# Patient Record
Sex: FEMALE | NOT HISPANIC OR LATINO | Employment: UNEMPLOYED | URBAN - METROPOLITAN AREA
[De-identification: names, ages, dates, MRNs, and addresses within clinical notes are randomized per-mention and may not be internally consistent; named-entity substitution may affect disease eponyms.]

---

## 2021-10-26 ENCOUNTER — OFFICE VISIT (OUTPATIENT)
Dept: INTERNAL MEDICINE | Facility: CLINIC | Age: 46
End: 2021-10-26

## 2021-10-26 VITALS
SYSTOLIC BLOOD PRESSURE: 136 MMHG | BODY MASS INDEX: 23.71 KG/M2 | HEART RATE: 89 BPM | WEIGHT: 133.8 LBS | RESPIRATION RATE: 17 BRPM | TEMPERATURE: 98.2 F | OXYGEN SATURATION: 99 % | HEIGHT: 63 IN | DIASTOLIC BLOOD PRESSURE: 76 MMHG

## 2021-10-26 DIAGNOSIS — Z12.31 ENCOUNTER FOR SCREENING MAMMOGRAM FOR MALIGNANT NEOPLASM OF BREAST: ICD-10-CM

## 2021-10-26 DIAGNOSIS — Z12.11 SCREEN FOR COLON CANCER: ICD-10-CM

## 2021-10-26 DIAGNOSIS — Z76.89 ENCOUNTER TO ESTABLISH CARE: ICD-10-CM

## 2021-10-26 DIAGNOSIS — R63.5 WEIGHT GAIN: ICD-10-CM

## 2021-10-26 DIAGNOSIS — Z13.31 SCREENING FOR DEPRESSION: ICD-10-CM

## 2021-10-26 DIAGNOSIS — Z23 NEED FOR INFLUENZA VACCINATION: Primary | ICD-10-CM

## 2021-10-26 DIAGNOSIS — S01.81XD FACIAL LACERATION, SUBSEQUENT ENCOUNTER: ICD-10-CM

## 2021-10-26 PROCEDURE — 99203 OFFICE O/P NEW LOW 30 MIN: CPT | Performed by: STUDENT IN AN ORGANIZED HEALTH CARE EDUCATION/TRAINING PROGRAM

## 2021-10-26 PROCEDURE — 90686 IIV4 VACC NO PRSV 0.5 ML IM: CPT | Performed by: STUDENT IN AN ORGANIZED HEALTH CARE EDUCATION/TRAINING PROGRAM

## 2021-10-26 PROCEDURE — 90471 IMMUNIZATION ADMIN: CPT | Performed by: STUDENT IN AN ORGANIZED HEALTH CARE EDUCATION/TRAINING PROGRAM

## 2021-10-26 RX ORDER — ACETAMINOPHEN 500 MG
1200 TABLET ORAL DAILY
Qty: 90 EACH | Refills: 3 | Status: SHIPPED | OUTPATIENT
Start: 2021-10-26 | End: 2022-02-16 | Stop reason: SDUPTHER

## 2021-10-26 RX ORDER — HYDROCORTISONE VALERATE CREAM 2 MG/G
1 CREAM TOPICAL 2 TIMES DAILY
Qty: 45 G | Refills: 11 | Status: SHIPPED | OUTPATIENT
Start: 2021-10-26 | End: 2022-11-22 | Stop reason: SDUPTHER

## 2021-10-26 RX ORDER — HYDROQUINONE 40 MG/G
1 CREAM TOPICAL DAILY
Qty: 28.35 G | Refills: 11 | Status: SHIPPED | OUTPATIENT
Start: 2021-10-26 | End: 2022-11-22 | Stop reason: SDUPTHER

## 2021-10-26 RX ORDER — HYDROCORTISONE VALERATE CREAM 2 MG/G
1 CREAM TOPICAL 2 TIMES DAILY
COMMUNITY
End: 2021-10-26 | Stop reason: SDUPTHER

## 2021-10-26 RX ORDER — HYDROQUINONE 40 MG/G
1 CREAM TOPICAL DAILY
COMMUNITY
Start: 2021-09-17 | End: 2021-10-26 | Stop reason: SDUPTHER

## 2021-10-26 RX ORDER — ACETAMINOPHEN 500 MG
1200 TABLET ORAL DAILY
COMMUNITY
End: 2021-10-26 | Stop reason: SDUPTHER

## 2021-10-27 ENCOUNTER — PATIENT ROUNDING (BHMG ONLY) (OUTPATIENT)
Dept: INTERNAL MEDICINE | Facility: CLINIC | Age: 46
End: 2021-10-27

## 2021-10-27 NOTE — PROGRESS NOTES
October 27, 2021    Hello, may I speak with Cyndie Osborne?    My name is Geraldine      I am  with [facility]  [address] [place] [id number] [phone].    Left message to return call if there were any issues or needs from our office

## 2021-11-16 ENCOUNTER — CLINICAL SUPPORT (OUTPATIENT)
Dept: INTERNAL MEDICINE | Facility: CLINIC | Age: 46
End: 2021-11-16

## 2021-11-16 DIAGNOSIS — R63.5 WEIGHT GAIN: ICD-10-CM

## 2021-11-16 LAB
ALBUMIN SERPL-MCNC: 4.4 G/DL (ref 3.5–5.2)
ALBUMIN/GLOB SERPL: 1.7 G/DL
ALP SERPL-CCNC: 45 U/L (ref 39–117)
ALT SERPL W P-5'-P-CCNC: 7 U/L (ref 1–33)
ANION GAP SERPL CALCULATED.3IONS-SCNC: 7.8 MMOL/L (ref 5–15)
AST SERPL-CCNC: 13 U/L (ref 1–32)
BASOPHILS # BLD AUTO: 0.03 10*3/MM3 (ref 0–0.2)
BASOPHILS NFR BLD AUTO: 0.6 % (ref 0–1.5)
BILIRUB SERPL-MCNC: 0.4 MG/DL (ref 0–1.2)
BUN SERPL-MCNC: 11 MG/DL (ref 6–20)
BUN/CREAT SERPL: 13.9 (ref 7–25)
CALCIUM SPEC-SCNC: 8.8 MG/DL (ref 8.6–10.5)
CHLORIDE SERPL-SCNC: 104 MMOL/L (ref 98–107)
CHOLEST SERPL-MCNC: 166 MG/DL (ref 0–200)
CO2 SERPL-SCNC: 26.2 MMOL/L (ref 22–29)
CORTIS AM PEAK SERPL-MCNC: 10.72 MCG/DL (ref 6.02–18.4)
CREAT SERPL-MCNC: 0.79 MG/DL (ref 0.57–1)
DEPRECATED RDW RBC AUTO: 44.8 FL (ref 37–54)
EOSINOPHIL # BLD AUTO: 0.04 10*3/MM3 (ref 0–0.4)
EOSINOPHIL NFR BLD AUTO: 0.9 % (ref 0.3–6.2)
ERYTHROCYTE [DISTWIDTH] IN BLOOD BY AUTOMATED COUNT: 11.8 % (ref 12.3–15.4)
GFR SERPL CREATININE-BSD FRML MDRD: 79 ML/MIN/1.73
GFR SERPL CREATININE-BSD FRML MDRD: 95 ML/MIN/1.73
GLOBULIN UR ELPH-MCNC: 2.6 GM/DL
GLUCOSE SERPL-MCNC: 93 MG/DL (ref 65–99)
HBA1C MFR BLD: 4.94 % (ref 4.8–5.6)
HCT VFR BLD AUTO: 44.2 % (ref 34–46.6)
HDLC SERPL-MCNC: 61 MG/DL (ref 40–60)
HGB BLD-MCNC: 13.9 G/DL (ref 12–15.9)
IMM GRANULOCYTES # BLD AUTO: 0.01 10*3/MM3 (ref 0–0.05)
IMM GRANULOCYTES NFR BLD AUTO: 0.2 % (ref 0–0.5)
LDLC SERPL CALC-MCNC: 92 MG/DL (ref 0–100)
LDLC/HDLC SERPL: 1.5 {RATIO}
LYMPHOCYTES # BLD AUTO: 1.42 10*3/MM3 (ref 0.7–3.1)
LYMPHOCYTES NFR BLD AUTO: 30.3 % (ref 19.6–45.3)
MCH RBC QN AUTO: 32 PG (ref 26.6–33)
MCHC RBC AUTO-ENTMCNC: 31.4 G/DL (ref 31.5–35.7)
MCV RBC AUTO: 101.8 FL (ref 79–97)
MONOCYTES # BLD AUTO: 0.35 10*3/MM3 (ref 0.1–0.9)
MONOCYTES NFR BLD AUTO: 7.5 % (ref 5–12)
NEUTROPHILS NFR BLD AUTO: 2.83 10*3/MM3 (ref 1.7–7)
NEUTROPHILS NFR BLD AUTO: 60.5 % (ref 42.7–76)
NRBC BLD AUTO-RTO: 0.2 /100 WBC (ref 0–0.2)
PLATELET # BLD AUTO: 259 10*3/MM3 (ref 140–450)
PMV BLD AUTO: 10.6 FL (ref 6–12)
POTASSIUM SERPL-SCNC: 3.9 MMOL/L (ref 3.5–5.2)
PROT SERPL-MCNC: 7 G/DL (ref 6–8.5)
RBC # BLD AUTO: 4.34 10*6/MM3 (ref 3.77–5.28)
SODIUM SERPL-SCNC: 138 MMOL/L (ref 136–145)
T4 FREE SERPL-MCNC: 1.21 NG/DL (ref 0.93–1.7)
TRIGL SERPL-MCNC: 68 MG/DL (ref 0–150)
TSH SERPL DL<=0.05 MIU/L-ACNC: 1.99 UIU/ML (ref 0.27–4.2)
VLDLC SERPL-MCNC: 13 MG/DL (ref 5–40)
WBC # BLD AUTO: 4.68 10*3/MM3 (ref 3.4–10.8)

## 2021-11-16 PROCEDURE — 85025 COMPLETE CBC W/AUTO DIFF WBC: CPT | Performed by: STUDENT IN AN ORGANIZED HEALTH CARE EDUCATION/TRAINING PROGRAM

## 2021-11-16 PROCEDURE — 80061 LIPID PANEL: CPT | Performed by: STUDENT IN AN ORGANIZED HEALTH CARE EDUCATION/TRAINING PROGRAM

## 2021-11-16 PROCEDURE — 82533 TOTAL CORTISOL: CPT | Performed by: STUDENT IN AN ORGANIZED HEALTH CARE EDUCATION/TRAINING PROGRAM

## 2021-11-16 PROCEDURE — 84443 ASSAY THYROID STIM HORMONE: CPT | Performed by: STUDENT IN AN ORGANIZED HEALTH CARE EDUCATION/TRAINING PROGRAM

## 2021-11-16 PROCEDURE — 84439 ASSAY OF FREE THYROXINE: CPT | Performed by: STUDENT IN AN ORGANIZED HEALTH CARE EDUCATION/TRAINING PROGRAM

## 2021-11-16 PROCEDURE — 83036 HEMOGLOBIN GLYCOSYLATED A1C: CPT | Performed by: STUDENT IN AN ORGANIZED HEALTH CARE EDUCATION/TRAINING PROGRAM

## 2021-11-16 PROCEDURE — 80053 COMPREHEN METABOLIC PANEL: CPT | Performed by: STUDENT IN AN ORGANIZED HEALTH CARE EDUCATION/TRAINING PROGRAM

## 2021-11-16 PROCEDURE — 36415 COLL VENOUS BLD VENIPUNCTURE: CPT | Performed by: STUDENT IN AN ORGANIZED HEALTH CARE EDUCATION/TRAINING PROGRAM

## 2021-11-16 PROCEDURE — 86800 THYROGLOBULIN ANTIBODY: CPT | Performed by: STUDENT IN AN ORGANIZED HEALTH CARE EDUCATION/TRAINING PROGRAM

## 2021-11-17 LAB — THYROGLOB AB SERPL-ACNC: <1 IU/ML (ref 0–0.9)

## 2021-12-03 ENCOUNTER — TELEPHONE (OUTPATIENT)
Dept: INTERNAL MEDICINE | Facility: CLINIC | Age: 46
End: 2021-12-03

## 2021-12-03 NOTE — TELEPHONE ENCOUNTER
Caller: Cyndie Osborne    Relationship to patient: Self    Best call back number: [phone]     PATIENT RECEIVED TEST RESULTS AND WANTED DOCTOR JOSUE OCHOA KNOW.

## 2021-12-04 NOTE — TELEPHONE ENCOUNTER
Dr. Reyna patient just wanted to let you know that she has received her lab results. Nothing else said so I'm not sure what else I need to do.

## 2022-02-16 ENCOUNTER — OFFICE VISIT (OUTPATIENT)
Dept: INTERNAL MEDICINE | Facility: CLINIC | Age: 47
End: 2022-02-16

## 2022-02-16 VITALS
TEMPERATURE: 98.1 F | WEIGHT: 131.2 LBS | RESPIRATION RATE: 17 BRPM | BODY MASS INDEX: 23.25 KG/M2 | DIASTOLIC BLOOD PRESSURE: 76 MMHG | HEART RATE: 107 BPM | OXYGEN SATURATION: 100 % | SYSTOLIC BLOOD PRESSURE: 128 MMHG | HEIGHT: 63 IN

## 2022-02-16 DIAGNOSIS — D75.89 MACROCYTOSIS: ICD-10-CM

## 2022-02-16 DIAGNOSIS — S01.81XD FACIAL LACERATION, SUBSEQUENT ENCOUNTER: Primary | ICD-10-CM

## 2022-02-16 DIAGNOSIS — L65.9 ALOPECIA: ICD-10-CM

## 2022-02-16 PROCEDURE — 99213 OFFICE O/P EST LOW 20 MIN: CPT | Performed by: STUDENT IN AN ORGANIZED HEALTH CARE EDUCATION/TRAINING PROGRAM

## 2022-02-16 NOTE — PROGRESS NOTES
Chief Complaint  Weight Check, wants labs done at [facility] (needs a order), and Alopecia    Subjective            Cyndie Osborne presents to [facility]  History of Present Illness    Scar:   Last seen by plastic in November.   Next follow up scheduled for June.  Her current skin regimen include:   Chemical peel about 2x per week  microdermabrasion every other day       Scar has been doing better.     Alopecia:   Rogaine was recommended for small patch of alopecia over left frontal scalp. Patient decided to trial Nioxin product instead for fear of potential hair loss with Rogaine.   Started using Nioxin over concerning patch, used for 3 mos and started noticing worsening hairloss. Hair turned brittle, and pt experienced swelling at site with scab and burning of her scalp.     She stopped using the Nioxin and the area is now started to heal.     Hair thinning:   Wanting to take high dose of biotin, hair and nails supplement.   Has hx of abnormal kidney labs in past and was told to discontinue all otc multivitamins.   She would like to make sure that the biotin supplement she is considering is safe to take.     Of note patient with macrocytosis w/o anemia on her last labs. She was informed of this as well as need for B12 and folate levels. Pt increased B12 and folate supplements pre-emptively and had large skin sores which improved with her discontinuing her B12 and folate.       History reviewed. No pertinent past medical history.    Allergies:   Allergies   Allergen Reactions   • Aloe Vera Other (See Comments)     Bruises all over her body, messed with her labs          History reviewed. No pertinent surgical history.       Social History     Socioeconomic History   • Marital status:    Tobacco Use   • Smoking status: Never Smoker   • Smokeless tobacco: Never Used   Vaping Use   • Vaping Use: Never used   Substance and Sexual Activity   • Alcohol use: Yes   • Drug  "use: Never   • Sexual activity: Yes     Partners: Male         History reviewed. No pertinent family history.       Health Maintenance Due   Topic Date Due   • COLORECTAL CANCER SCREENING  Never done   • ANNUAL PHYSICAL  Never done   • COVID-19 Vaccine (1) Never done   • TDAP/TD VACCINES (1 - Tdap) Never done   • HEPATITIS C SCREENING  Never done   • PAP SMEAR  Never done            Current Outpatient Medications:   •  Cyanocobalamin 1000 MCG capsule, Take 1,000 mcg by mouth Daily., Disp: 90 capsule, Rfl: 3  •  hydrocortisone (WESTCORT) 0.2 % cream, Apply 1 application topically to the appropriate area as directed 2 (Two) Times a Day., Disp: 45 g, Rfl: 11  •  hydrocortisone 2.5 % cream, Apply 1 application topically to the appropriate area as directed Daily. Rectal, Disp: 28 g, Rfl: 11  •  hydroquinone 4 % cream, Apply 1 application topically to the appropriate area as directed Daily., Disp: 28.35 g, Rfl: 11      Immunization History   Administered Date(s) Administered   • FluLaval/Fluarix/Fluzone >6 10/26/2021         Review of Systems   Per HPI     Objective       Vitals:    02/16/22 1340   BP: 128/76   BP Location: Right arm   Patient Position: Sitting   Pulse: 107   Resp: 17   Temp: 98.1 °F (36.7 °C)   TempSrc: Oral   SpO2: 100%   Weight: 59.5 kg (131 lb 3.2 oz)   Height: 160 cm (62.99\")     Body mass index is 23.25 kg/m².      Physical Exam  Vitals reviewed.   Constitutional:       Appearance: Normal appearance.   HENT:      Head: Normocephalic and atraumatic.      Nose: Nose normal.   Eyes:      Extraocular Movements: Extraocular movements intact.      Conjunctiva/sclera: Conjunctivae normal.   Pulmonary:      Effort: Pulmonary effort is normal. No respiratory distress.   Musculoskeletal:         General: Normal range of motion.   Skin:     General: Skin is warm and dry.      Comments: Frontal scar is significantly improved compared to prior visit.     Thinning hair over frontal scalp, mild scarring with scab  " noted over involved area.    Neurological:      General: No focal deficit present.      Mental Status: She is alert and oriented to person, place, and time.      Cranial Nerves: No cranial nerve deficit.   Psychiatric:         Mood and Affect: Mood normal.         Behavior: Behavior normal.         Thought Content: Thought content normal.             Result Review :                           Assessment and Plan      Diagnoses and all orders for this visit:    1. Facial laceration, subsequent encounter (Primary)  Comments:  Chronic, improving with current home regimen which she is advised to continue. Following with plastic.     2. Alopecia  Comments:  Chronic, stable and improving with discontinuation of nioxin. Reviewed biotin product that she is planning to use, and ingredients appear to be safe.     3. Macrocytosis  Comments:  Noted on labs from November. B12 and folate labs ordered. order printed as she is planning to get these done at [facility].   Orders:  -     Cancel: Vitamin B12 & Folate; Future  -     Vitamin B12 & Folate             Follow Up     Return in about 6 months (around 8/16/2022) for Recheck.    Patient was given instructions and counseling regarding her condition or for health maintenance advice. Please see specific information pulled into the AVS if appropriate.     Maribell Machuca MD   Internal Medicine-Pediatrics

## 2022-04-14 ENCOUNTER — OFFICE VISIT (OUTPATIENT)
Dept: INTERNAL MEDICINE | Facility: CLINIC | Age: 47
End: 2022-04-14

## 2022-04-14 VITALS
TEMPERATURE: 97.8 F | WEIGHT: 129.2 LBS | OXYGEN SATURATION: 99 % | HEART RATE: 102 BPM | BODY MASS INDEX: 22.89 KG/M2 | HEIGHT: 63 IN | RESPIRATION RATE: 18 BRPM | DIASTOLIC BLOOD PRESSURE: 72 MMHG | SYSTOLIC BLOOD PRESSURE: 126 MMHG

## 2022-04-14 DIAGNOSIS — L65.9 HAIR THINNING: Primary | ICD-10-CM

## 2022-04-14 DIAGNOSIS — R21 SKIN RASH: ICD-10-CM

## 2022-04-14 PROCEDURE — 99214 OFFICE O/P EST MOD 30 MIN: CPT | Performed by: STUDENT IN AN ORGANIZED HEALTH CARE EDUCATION/TRAINING PROGRAM

## 2022-04-15 ENCOUNTER — CLINICAL SUPPORT (OUTPATIENT)
Dept: INTERNAL MEDICINE | Facility: CLINIC | Age: 47
End: 2022-04-15

## 2022-04-15 DIAGNOSIS — L65.9 HAIR THINNING: ICD-10-CM

## 2022-04-15 LAB
ALBUMIN SERPL-MCNC: 4.7 G/DL (ref 3.5–5.2)
ALBUMIN/GLOB SERPL: 1.8 G/DL
ALP SERPL-CCNC: 48 U/L (ref 39–117)
ALT SERPL W P-5'-P-CCNC: 10 U/L (ref 1–33)
ANION GAP SERPL CALCULATED.3IONS-SCNC: 10.6 MMOL/L (ref 5–15)
AST SERPL-CCNC: 16 U/L (ref 1–32)
BASOPHILS # BLD AUTO: 0.03 10*3/MM3 (ref 0–0.2)
BASOPHILS NFR BLD AUTO: 0.6 % (ref 0–1.5)
BILIRUB SERPL-MCNC: 0.6 MG/DL (ref 0–1.2)
BUN SERPL-MCNC: 10 MG/DL (ref 6–20)
BUN/CREAT SERPL: 11.4 (ref 7–25)
CALCIUM SPEC-SCNC: 9.5 MG/DL (ref 8.6–10.5)
CHLORIDE SERPL-SCNC: 104 MMOL/L (ref 98–107)
CHOLEST SERPL-MCNC: 192 MG/DL (ref 0–200)
CO2 SERPL-SCNC: 22.4 MMOL/L (ref 22–29)
CREAT SERPL-MCNC: 0.88 MG/DL (ref 0.57–1)
DEPRECATED RDW RBC AUTO: 43.4 FL (ref 37–54)
EGFRCR SERPLBLD CKD-EPI 2021: 82.7 ML/MIN/1.73
EOSINOPHIL # BLD AUTO: 0.08 10*3/MM3 (ref 0–0.4)
EOSINOPHIL NFR BLD AUTO: 1.6 % (ref 0.3–6.2)
ERYTHROCYTE [DISTWIDTH] IN BLOOD BY AUTOMATED COUNT: 11.9 % (ref 12.3–15.4)
FOLATE SERPL-MCNC: 18 NG/ML (ref 4.78–24.2)
FSH SERPL-ACNC: 13.5 MIU/ML
GLOBULIN UR ELPH-MCNC: 2.6 GM/DL
GLUCOSE SERPL-MCNC: 93 MG/DL (ref 65–99)
HCT VFR BLD AUTO: 42.6 % (ref 34–46.6)
HDLC SERPL-MCNC: 61 MG/DL (ref 40–60)
HGB BLD-MCNC: 14.1 G/DL (ref 12–15.9)
IMM GRANULOCYTES # BLD AUTO: 0.01 10*3/MM3 (ref 0–0.05)
IMM GRANULOCYTES NFR BLD AUTO: 0.2 % (ref 0–0.5)
LDLC SERPL CALC-MCNC: 122 MG/DL (ref 0–100)
LDLC/HDLC SERPL: 1.99 {RATIO}
LH SERPL-ACNC: 22.9 MIU/ML
LYMPHOCYTES # BLD AUTO: 1.51 10*3/MM3 (ref 0.7–3.1)
LYMPHOCYTES NFR BLD AUTO: 30.8 % (ref 19.6–45.3)
MCH RBC QN AUTO: 32.6 PG (ref 26.6–33)
MCHC RBC AUTO-ENTMCNC: 33.1 G/DL (ref 31.5–35.7)
MCV RBC AUTO: 98.4 FL (ref 79–97)
MONOCYTES # BLD AUTO: 0.45 10*3/MM3 (ref 0.1–0.9)
MONOCYTES NFR BLD AUTO: 9.2 % (ref 5–12)
NEUTROPHILS NFR BLD AUTO: 2.83 10*3/MM3 (ref 1.7–7)
NEUTROPHILS NFR BLD AUTO: 57.6 % (ref 42.7–76)
NRBC BLD AUTO-RTO: 0 /100 WBC (ref 0–0.2)
PLATELET # BLD AUTO: 227 10*3/MM3 (ref 140–450)
PMV BLD AUTO: 10.6 FL (ref 6–12)
POTASSIUM SERPL-SCNC: 4.2 MMOL/L (ref 3.5–5.2)
PROT SERPL-MCNC: 7.3 G/DL (ref 6–8.5)
RBC # BLD AUTO: 4.33 10*6/MM3 (ref 3.77–5.28)
SODIUM SERPL-SCNC: 137 MMOL/L (ref 136–145)
T4 FREE SERPL-MCNC: 1.51 NG/DL (ref 0.93–1.7)
TRIGL SERPL-MCNC: 49 MG/DL (ref 0–150)
TSH SERPL DL<=0.05 MIU/L-ACNC: 2.07 UIU/ML (ref 0.27–4.2)
VIT B12 BLD-MCNC: 1513 PG/ML (ref 211–946)
VLDLC SERPL-MCNC: 9 MG/DL (ref 5–40)
WBC NRBC COR # BLD: 4.91 10*3/MM3 (ref 3.4–10.8)

## 2022-04-15 PROCEDURE — 80053 COMPREHEN METABOLIC PANEL: CPT | Performed by: STUDENT IN AN ORGANIZED HEALTH CARE EDUCATION/TRAINING PROGRAM

## 2022-04-15 PROCEDURE — 84402 ASSAY OF FREE TESTOSTERONE: CPT | Performed by: STUDENT IN AN ORGANIZED HEALTH CARE EDUCATION/TRAINING PROGRAM

## 2022-04-15 PROCEDURE — 84443 ASSAY THYROID STIM HORMONE: CPT | Performed by: STUDENT IN AN ORGANIZED HEALTH CARE EDUCATION/TRAINING PROGRAM

## 2022-04-15 PROCEDURE — 82679 ASSAY OF ESTRONE: CPT | Performed by: STUDENT IN AN ORGANIZED HEALTH CARE EDUCATION/TRAINING PROGRAM

## 2022-04-15 PROCEDURE — 82746 ASSAY OF FOLIC ACID SERUM: CPT | Performed by: STUDENT IN AN ORGANIZED HEALTH CARE EDUCATION/TRAINING PROGRAM

## 2022-04-15 PROCEDURE — 82627 DEHYDROEPIANDROSTERONE: CPT | Performed by: STUDENT IN AN ORGANIZED HEALTH CARE EDUCATION/TRAINING PROGRAM

## 2022-04-15 PROCEDURE — 84439 ASSAY OF FREE THYROXINE: CPT | Performed by: STUDENT IN AN ORGANIZED HEALTH CARE EDUCATION/TRAINING PROGRAM

## 2022-04-15 PROCEDURE — 82670 ASSAY OF TOTAL ESTRADIOL: CPT | Performed by: STUDENT IN AN ORGANIZED HEALTH CARE EDUCATION/TRAINING PROGRAM

## 2022-04-15 PROCEDURE — 82607 VITAMIN B-12: CPT | Performed by: STUDENT IN AN ORGANIZED HEALTH CARE EDUCATION/TRAINING PROGRAM

## 2022-04-15 PROCEDURE — 85025 COMPLETE CBC W/AUTO DIFF WBC: CPT | Performed by: STUDENT IN AN ORGANIZED HEALTH CARE EDUCATION/TRAINING PROGRAM

## 2022-04-15 PROCEDURE — 84403 ASSAY OF TOTAL TESTOSTERONE: CPT | Performed by: STUDENT IN AN ORGANIZED HEALTH CARE EDUCATION/TRAINING PROGRAM

## 2022-04-15 PROCEDURE — 83001 ASSAY OF GONADOTROPIN (FSH): CPT | Performed by: STUDENT IN AN ORGANIZED HEALTH CARE EDUCATION/TRAINING PROGRAM

## 2022-04-15 PROCEDURE — 83002 ASSAY OF GONADOTROPIN (LH): CPT | Performed by: STUDENT IN AN ORGANIZED HEALTH CARE EDUCATION/TRAINING PROGRAM

## 2022-04-15 PROCEDURE — 36415 COLL VENOUS BLD VENIPUNCTURE: CPT | Performed by: STUDENT IN AN ORGANIZED HEALTH CARE EDUCATION/TRAINING PROGRAM

## 2022-04-15 PROCEDURE — 80061 LIPID PANEL: CPT | Performed by: STUDENT IN AN ORGANIZED HEALTH CARE EDUCATION/TRAINING PROGRAM

## 2022-04-16 LAB — DHEA-S SERPL-MCNC: 115 UG/DL (ref 41.2–243.7)

## 2022-04-17 LAB
TESTOST FREE SERPL-MCNC: 0.8 PG/ML (ref 0–4.2)
TESTOST SERPL-MCNC: 20 NG/DL (ref 4–50)

## 2022-04-18 LAB
ESTRADIOL SERPL-MCNC: 291 PG/ML
ESTRONE SERPL-MCNC: 111 PG/ML

## 2022-04-28 ENCOUNTER — TELEPHONE (OUTPATIENT)
Dept: INTERNAL MEDICINE | Facility: CLINIC | Age: 47
End: 2022-04-28

## 2022-04-28 NOTE — TELEPHONE ENCOUNTER
Caller: Cyndie Osborne    Relationship: Self    Best call back number: [phone]      What test was performed: LABS    When was the test performed: 04/15/22    Where was the test performed: IN OFFICE    Additional notes: THE PATIENT WOULD LIKE A CALL BACK TO DISCUSS LAB RESULTS

## 2022-05-02 NOTE — TELEPHONE ENCOUNTER
Red rule verified and correct.    Pt calling again for results.    Advised HUB to let pt know that Dr Reyna will be contacted to give info regarding labs.

## 2022-05-02 NOTE — TELEPHONE ENCOUNTER
Caller: Cyndie Osborne    Relationship: Self    Best call back number: [phone]    Who are you requesting to speak with (clinical staff, provider,  specific staff member): CLINICAL    What was the call regarding: PATIENT STATES SHE IS STILL WAITING FOR THESE RESULTS.    Do you require a callback: YES

## 2022-05-03 NOTE — TELEPHONE ENCOUNTER
Hub staff attempted to follow warm transfer process and was unsuccessful     Caller: Cyndie Osborne    Relationship to patient: Self    Best call back number:  [phone]      Patient is needing:PATIENT CALLING REGARDING LAB RESULTS, PLEASE ADVISE.

## 2022-05-05 ENCOUNTER — TELEPHONE (OUTPATIENT)
Dept: INTERNAL MEDICINE | Facility: CLINIC | Age: 47
End: 2022-05-05

## 2022-05-05 NOTE — TELEPHONE ENCOUNTER
Pt called this morning asking about her lab results from 4/15/2022. Pt was notified about her Vit B12 and folate results. A secure message was sent to PCP to please interpret lab results so that pt could be notified. Pt was also notified that we will call her once PCP has interpreted her lab values. Pt voiced understanding.

## 2022-05-06 ENCOUNTER — TELEPHONE (OUTPATIENT)
Dept: INTERNAL MEDICINE | Facility: CLINIC | Age: 47
End: 2022-05-06

## 2022-05-06 NOTE — TELEPHONE ENCOUNTER
Patient is calling and would like her lab results from 04/15/22. Only the b12 that I see has been resulted on.

## 2022-05-09 ENCOUNTER — TELEPHONE (OUTPATIENT)
Dept: INTERNAL MEDICINE | Facility: CLINIC | Age: 47
End: 2022-05-09

## 2022-05-09 NOTE — TELEPHONE ENCOUNTER
Caller: Cyndie Osborne    Relationship: Self    Best call back number: [phone]    What is the best time to reach you: ANYTIME     Who are you requesting to speak with (clinical staff, provider,  specific staff member): CLINICAL      Do you know the name of the person who called: GILDARDO     What was the call regarding: PATIENT RETURNING A CALL TO GILDARDO, PATIENT STATES SHE HAS SPOKE TO SUSANNAH, IF ANY FURTHER QUESTIONS TO CONTACT PATIENT.     Do you require a callback: YES

## 2022-05-10 NOTE — TELEPHONE ENCOUNTER
Red rule verified and correct.    Pt calling for results.    Went over specific results per pt request after relaying message from Dr Maribell Machuca.    Pt still wants some clarification regarding her hair falling out but stated she would do so at next OV..

## 2022-07-20 ENCOUNTER — OFFICE VISIT (OUTPATIENT)
Dept: INTERNAL MEDICINE | Facility: CLINIC | Age: 47
End: 2022-07-20

## 2022-07-20 VITALS
SYSTOLIC BLOOD PRESSURE: 138 MMHG | WEIGHT: 131 LBS | HEART RATE: 70 BPM | OXYGEN SATURATION: 100 % | RESPIRATION RATE: 18 BRPM | TEMPERATURE: 98.4 F | HEIGHT: 63 IN | BODY MASS INDEX: 23.21 KG/M2 | DIASTOLIC BLOOD PRESSURE: 82 MMHG

## 2022-07-20 DIAGNOSIS — S01.81XD FACIAL LACERATION, SUBSEQUENT ENCOUNTER: ICD-10-CM

## 2022-07-20 DIAGNOSIS — L65.9 HAIR THINNING: Primary | ICD-10-CM

## 2022-07-20 PROCEDURE — 99213 OFFICE O/P EST LOW 20 MIN: CPT | Performed by: STUDENT IN AN ORGANIZED HEALTH CARE EDUCATION/TRAINING PROGRAM

## 2022-10-17 ENCOUNTER — TELEPHONE (OUTPATIENT)
Dept: INTERNAL MEDICINE | Facility: CLINIC | Age: 47
End: 2022-10-17

## 2022-10-17 NOTE — TELEPHONE ENCOUNTER
Caller: Cyndie Osborne    Relationship: Self    Best call back number: [phone]    What orders are you requesting (i.e. lab or imaging): ANNUAL MAMMAGRAM    Where will you receive your lab/imaging services: [facility] - FAX ORDER -[phone]    Additional notes: PATIENT REPORTS LAST MAMMOGRAM WAS 11/15/2021    PATIENT REQUESTS CALL WHEN  ORDER HAS BEEN FAXED

## 2022-10-20 DIAGNOSIS — Z12.31 ENCOUNTER FOR SCREENING MAMMOGRAM FOR MALIGNANT NEOPLASM OF BREAST: Primary | ICD-10-CM

## 2022-10-20 NOTE — TELEPHONE ENCOUNTER
PATIENT HAS CALLED BACK CHECKING TO SEE IF THIS HAS BEEN TAKEN CARE OF. PLEASE FAX ORDER TO [facility] -[phone]

## 2022-11-04 ENCOUNTER — TELEPHONE (OUTPATIENT)
Dept: INTERNAL MEDICINE | Facility: CLINIC | Age: 47
End: 2022-11-04

## 2022-11-04 NOTE — TELEPHONE ENCOUNTER
Caller: Cyndie Osborne    Relationship to patient: Self    Best call back number: [phone]    Chief complaint: EARS CLEANED      Additional notes: PATIENT HAS APPOINTMENT SCHEDULED FOR 4 MO FOLLOW UP AND WOULD LIKE TO HAVE EAR CLEANED AT THE SAME TIME.    PLEASE ADVISE

## 2022-11-22 ENCOUNTER — OFFICE VISIT (OUTPATIENT)
Dept: INTERNAL MEDICINE | Facility: CLINIC | Age: 47
End: 2022-11-22
Payer: OTHER GOVERNMENT

## 2022-11-22 VITALS
HEART RATE: 81 BPM | WEIGHT: 132.8 LBS | SYSTOLIC BLOOD PRESSURE: 112 MMHG | BODY MASS INDEX: 23.53 KG/M2 | HEIGHT: 63 IN | TEMPERATURE: 98.6 F | OXYGEN SATURATION: 96 % | DIASTOLIC BLOOD PRESSURE: 70 MMHG

## 2022-11-22 DIAGNOSIS — H53.9 CHANGE IN VISION: ICD-10-CM

## 2022-11-22 DIAGNOSIS — Z76.0 MEDICATION REFILL: ICD-10-CM

## 2022-11-22 DIAGNOSIS — E78.5 HYPERLIPIDEMIA, UNSPECIFIED HYPERLIPIDEMIA TYPE: ICD-10-CM

## 2022-11-22 DIAGNOSIS — L98.9 SCALP LESION: Primary | ICD-10-CM

## 2022-11-22 DIAGNOSIS — R74.8 ELEVATED VITAMIN B12 LEVEL: ICD-10-CM

## 2022-11-22 PROCEDURE — 99214 OFFICE O/P EST MOD 30 MIN: CPT | Performed by: STUDENT IN AN ORGANIZED HEALTH CARE EDUCATION/TRAINING PROGRAM

## 2022-11-22 RX ORDER — HYDROCORTISONE VALERATE CREAM 2 MG/G
1 CREAM TOPICAL 2 TIMES DAILY
Qty: 45 G | Refills: 3 | Status: SHIPPED | OUTPATIENT
Start: 2022-11-22 | End: 2022-11-23 | Stop reason: SDUPTHER

## 2022-11-22 RX ORDER — HYDROQUINONE 40 MG/G
1 CREAM TOPICAL DAILY
Qty: 28.35 G | Refills: 3 | Status: SHIPPED | OUTPATIENT
Start: 2022-11-22 | End: 2022-11-23 | Stop reason: SDUPTHER

## 2022-11-22 NOTE — PROGRESS NOTES
"Chief Complaint  Follow-up (Pt here for f/u on labs and other issues she would like to discuss)    Subjective            Cyndie Osborne presents to [facility]  History of Present Illness    Frontal scarring:  States she is treating on her own.   She was previously  Seeing a plastic surgeon but would like to pursue a second opinion.   Seen by Dr. Stokes, dermatology but does not do scar either.  She was referred to a nurse practionner who she was told take care of scars but was informed by provider's office that she does not follow patient for scars.       Left vision field:   Fuzzy with far sighted and states she can see things are off balance. If she looks at a straight line then she will notice a peak,   States she feels like the left eye feels like a  blob of meatball, dead meatball. \"  Has hx of dry eyes.   Last eye exam was about 2-3 years ago.   Endorses hx of gritty sensation in the left eye about 1 year ago.   Just signed up for vision insurance.    Health maintenance:   UTD w/ flu and covid- bivalent.   10/26/22  Last tetanus was about 2020.     Scalp concern:   Chronic and continues to improve.   Has scabs in baggies to show provider from her scalp related to the inflammation she experienced on her scalp w/ use of Nioxin products.     History reviewed. No pertinent past medical history.    Allergies:   Allergies   Allergen Reactions   • Aloe Vera Other (See Comments)     Bruises all over her body, messed with her labs          History reviewed. No pertinent surgical history.       Social History     Socioeconomic History   • Marital status:    Tobacco Use   • Smoking status: Never   • Smokeless tobacco: Never   Vaping Use   • Vaping Use: Never used   Substance and Sexual Activity   • Alcohol use: Yes     Comment: rarely   • Drug use: Never   • Sexual activity: Yes     Partners: Male         History reviewed. No pertinent family history.       Health " "Maintenance Due   Topic Date Due   • COLORECTAL CANCER SCREENING  Never done   • COVID-19 Vaccine (1) Never done   • TDAP/TD VACCINES (1 - Tdap) Never done   • HEPATITIS C SCREENING  Never done   • ANNUAL PHYSICAL  Never done   • PAP SMEAR  Never done   • INFLUENZA VACCINE  08/01/2022            Current Outpatient Medications:   •  Calcium Citrate-Vitamin D (CALCITRATE/VITAMIN D PO), Take 1 tablet by mouth Daily., Disp: , Rfl:   •  Lidocaine Viscous HCl (XYLOCAINE) 2 % solution, Take 5 mL by mouth 4 (Four) Times a Day As Needed for Mild Pain. Swish and spit, Disp: 100 mL, Rfl: 0  •  Cyanocobalamin 1000 MCG capsule, Take 1,000 mcg by mouth Daily., Disp: 90 capsule, Rfl: 3  •  hydrocortisone (WESTCORT) 0.2 % cream, Apply 1 application topically to the appropriate area as directed 2 (Two) Times a Day., Disp: 45 g, Rfl: 3  •  hydrocortisone 2.5 % cream, Apply 1 application topically to the appropriate area as directed Daily. Rectal, Disp: 28 g, Rfl: 3  •  hydroquinone 4 % cream, Apply 1 application topically to the appropriate area as directed Daily., Disp: 28.35 g, Rfl: 3      Immunization History   Administered Date(s) Administered   • FluLaval/Fluzone >6mos 10/26/2021         Review of Systems   Scalp bleed- 2 spots in the middle.     Objective       Vitals:    11/22/22 1341   BP: 112/70   BP Location: Left arm   Patient Position: Sitting   Cuff Size: Adult   Pulse: 81   Temp: 98.6 °F (37 °C)   TempSrc: Oral   SpO2: 96%   Weight: 60.2 kg (132 lb 12.8 oz)   Height: 160 cm (62.99\")     Body mass index is 23.53 kg/m².      Physical Exam  Vitals reviewed.   Constitutional:       Appearance: Normal appearance.   HENT:      Head: Normocephalic and atraumatic.      Nose: Nose normal.   Eyes:      Extraocular Movements: Extraocular movements intact.      Conjunctiva/sclera: Conjunctivae normal.   Pulmonary:      Effort: Pulmonary effort is normal. No respiratory distress.   Musculoskeletal:         General: Normal range of " motion.   Skin:     General: Skin is warm and dry.   Neurological:      General: No focal deficit present.      Mental Status: She is alert and oriented to person, place, and time.      Cranial Nerves: No cranial nerve deficit.   Psychiatric:         Mood and Affect: Mood normal.         Behavior: Behavior normal.         Thought Content: Thought content normal.      Comments: Tender to perseverate on frontal scar which is not visible unless patient calls attention to it. Scar is barely noticeable even when patient points to it's location.     Scalp lesion: inflammation is resolved. Thinning is no longer obvious as on previous visit.              Result Review :                           Assessment and Plan      Diagnoses and all orders for this visit:    1. Scalp lesion (Primary)  Comments:  chronic and stable. Area is not noticeable unless attention is called to it.     2. Change in vision  Comments:  Abrupt onset and intermittent. Unremarkable exam in office, however recommend that she sees an eye doctor ASAP given gritty sensation in eye for past 1year.     3. Medication refill  Comments:  Refilled meds.     4. Hyperlipidemia, unspecified hyperlipidemia type  Comments:  Chronic. Due for labs.   Orders:  -     Comprehensive Metabolic Panel; Future  -     CBC & Differential; Future  -     TSH; Future  -     Lipid Panel; Future    5. Elevated vitamin B12 level  Comments:  Due for labs.   Orders:  -     Vitamin B12; Future    Other orders  -     : Cyanocobalamin 1000 MCG capsule; Take 1,000 mcg by mouth Daily.  Dispense: 90 capsule; Refill: 3  -     : hydrocortisone (WESTCORT) 0.2 % cream; Apply 1 application topically to the appropriate area as directed 2 (Two) Times a Day.  Dispense: 45 g; Refill: 3  -     : hydroquinone 4 % cream; Apply 1 application topically to the appropriate area as directed Daily.  Dispense: 28.35 g; Refill: 3  -     : tretinoin (RETIN-A) 0.025 % cream; Apply 1 application topically to the  appropriate area as directed Every Night for 30 days.  Dispense: 45 g; Refill: 3  -     : hydrocortisone 2.5 % cream; Apply 1 application topically to the appropriate area as directed Daily. Rectal  Dispense: 28 g; Refill: 3      I spent at least 30 minutes caring for Cyndie on this date of service. This time includes time spent by me in the following activities:reviewing tests, performing a medically appropriate examination and/or evaluation , counseling and educating the patient/family/caregiver, documenting information in the medical record and extensive amount of time was spent encouraging pt to f/u w/ optometrist for eye exam as well as reassuring her regarding her scalp and scar concern . Discussed referral for second opinion, however she is not interested in going beyond the local providers.     Follow Up     Return in about 4 months (around 3/22/2023) for HLD .    Patient was given instructions and counseling regarding her condition or for health maintenance advice. Please see specific information pulled into the AVS if appropriate.     Maribell Machuca MD   Internal Medicine-Pediatrics

## 2022-11-23 RX ORDER — HYDROQUINONE 40 MG/G
1 CREAM TOPICAL DAILY
Qty: 28.35 G | Refills: 3 | Status: SHIPPED | OUTPATIENT
Start: 2022-11-23

## 2022-11-23 RX ORDER — HYDROCORTISONE VALERATE CREAM 2 MG/G
1 CREAM TOPICAL 2 TIMES DAILY
Qty: 45 G | Refills: 3 | Status: SHIPPED | OUTPATIENT
Start: 2022-11-23

## 2022-12-29 DIAGNOSIS — Z12.31 ENCOUNTER FOR SCREENING MAMMOGRAM FOR MALIGNANT NEOPLASM OF BREAST: Primary | ICD-10-CM

## 2023-02-21 ENCOUNTER — TELEPHONE (OUTPATIENT)
Dept: INTERNAL MEDICINE | Facility: CLINIC | Age: 48
End: 2023-02-21
Payer: OTHER GOVERNMENT

## 2023-11-02 ENCOUNTER — OFFICE VISIT (OUTPATIENT)
Dept: INTERNAL MEDICINE | Facility: CLINIC | Age: 48
End: 2023-11-02
Payer: OTHER GOVERNMENT

## 2023-11-02 VITALS
DIASTOLIC BLOOD PRESSURE: 77 MMHG | TEMPERATURE: 97.8 F | BODY MASS INDEX: 23.42 KG/M2 | WEIGHT: 132.2 LBS | HEIGHT: 63 IN | OXYGEN SATURATION: 100 % | HEART RATE: 83 BPM | SYSTOLIC BLOOD PRESSURE: 122 MMHG

## 2023-11-02 DIAGNOSIS — Z53.20 SCREENING FOR HEPATITIS C DECLINED: ICD-10-CM

## 2023-11-02 DIAGNOSIS — Z76.0 MEDICATION REFILL: ICD-10-CM

## 2023-11-02 DIAGNOSIS — Z00.00 ANNUAL PHYSICAL EXAM: Primary | ICD-10-CM

## 2023-11-02 DIAGNOSIS — Z12.31 ENCOUNTER FOR SCREENING MAMMOGRAM FOR MALIGNANT NEOPLASM OF BREAST: ICD-10-CM

## 2023-11-02 DIAGNOSIS — E78.5 HYPERLIPIDEMIA, UNSPECIFIED HYPERLIPIDEMIA TYPE: ICD-10-CM

## 2023-11-02 DIAGNOSIS — Z53.20 COLON CANCER SCREENING DECLINED: ICD-10-CM

## 2023-11-02 DIAGNOSIS — Z23 INFLUENZA VACCINE NEEDED: ICD-10-CM

## 2023-11-02 DIAGNOSIS — Z23 IMMUNIZATION DUE: ICD-10-CM

## 2023-11-02 DIAGNOSIS — R74.8 ELEVATED VITAMIN B12 LEVEL: ICD-10-CM

## 2023-11-02 DIAGNOSIS — Z53.20 CERVICAL CANCER SCREENING DECLINED: ICD-10-CM

## 2023-11-02 LAB
ALBUMIN SERPL-MCNC: 4.8 G/DL (ref 3.5–5.2)
ALBUMIN/GLOB SERPL: 1.8 G/DL
ALP SERPL-CCNC: 56 U/L (ref 39–117)
ALT SERPL W P-5'-P-CCNC: 9 U/L (ref 1–33)
ANION GAP SERPL CALCULATED.3IONS-SCNC: 10.5 MMOL/L (ref 5–15)
AST SERPL-CCNC: 15 U/L (ref 1–32)
BASOPHILS # BLD AUTO: 0.03 10*3/MM3 (ref 0–0.2)
BASOPHILS NFR BLD AUTO: 0.6 % (ref 0–1.5)
BILIRUB SERPL-MCNC: 0.4 MG/DL (ref 0–1.2)
BUN SERPL-MCNC: 12 MG/DL (ref 6–20)
BUN/CREAT SERPL: 13.2 (ref 7–25)
CALCIUM SPEC-SCNC: 10.2 MG/DL (ref 8.6–10.5)
CHLORIDE SERPL-SCNC: 103 MMOL/L (ref 98–107)
CHOLEST SERPL-MCNC: 241 MG/DL (ref 0–200)
CO2 SERPL-SCNC: 27.5 MMOL/L (ref 22–29)
CREAT SERPL-MCNC: 0.91 MG/DL (ref 0.57–1)
DEPRECATED RDW RBC AUTO: 43.4 FL (ref 37–54)
EGFRCR SERPLBLD CKD-EPI 2021: 78.5 ML/MIN/1.73
EOSINOPHIL # BLD AUTO: 0.09 10*3/MM3 (ref 0–0.4)
EOSINOPHIL NFR BLD AUTO: 1.9 % (ref 0.3–6.2)
ERYTHROCYTE [DISTWIDTH] IN BLOOD BY AUTOMATED COUNT: 12 % (ref 12.3–15.4)
GLOBULIN UR ELPH-MCNC: 2.6 GM/DL
GLUCOSE SERPL-MCNC: 93 MG/DL (ref 65–99)
HCT VFR BLD AUTO: 41.2 % (ref 34–46.6)
HDLC SERPL-MCNC: 70 MG/DL (ref 40–60)
HGB BLD-MCNC: 13.9 G/DL (ref 12–15.9)
IMM GRANULOCYTES # BLD AUTO: 0.01 10*3/MM3 (ref 0–0.05)
IMM GRANULOCYTES NFR BLD AUTO: 0.2 % (ref 0–0.5)
LDLC SERPL CALC-MCNC: 159 MG/DL (ref 0–100)
LDLC/HDLC SERPL: 2.24 {RATIO}
LYMPHOCYTES # BLD AUTO: 1.34 10*3/MM3 (ref 0.7–3.1)
LYMPHOCYTES NFR BLD AUTO: 28.6 % (ref 19.6–45.3)
MCH RBC QN AUTO: 33.1 PG (ref 26.6–33)
MCHC RBC AUTO-ENTMCNC: 33.7 G/DL (ref 31.5–35.7)
MCV RBC AUTO: 98.1 FL (ref 79–97)
MONOCYTES # BLD AUTO: 0.3 10*3/MM3 (ref 0.1–0.9)
MONOCYTES NFR BLD AUTO: 6.4 % (ref 5–12)
NEUTROPHILS NFR BLD AUTO: 2.91 10*3/MM3 (ref 1.7–7)
NEUTROPHILS NFR BLD AUTO: 62.3 % (ref 42.7–76)
NRBC BLD AUTO-RTO: 0 /100 WBC (ref 0–0.2)
PLATELET # BLD AUTO: 271 10*3/MM3 (ref 140–450)
PMV BLD AUTO: 10.7 FL (ref 6–12)
POTASSIUM SERPL-SCNC: 4 MMOL/L (ref 3.5–5.2)
PROT SERPL-MCNC: 7.4 G/DL (ref 6–8.5)
RBC # BLD AUTO: 4.2 10*6/MM3 (ref 3.77–5.28)
SODIUM SERPL-SCNC: 141 MMOL/L (ref 136–145)
TRIGL SERPL-MCNC: 70 MG/DL (ref 0–150)
TSH SERPL DL<=0.05 MIU/L-ACNC: 1.2 UIU/ML (ref 0.27–4.2)
VIT B12 BLD-MCNC: 817 PG/ML (ref 211–946)
VLDLC SERPL-MCNC: 12 MG/DL (ref 5–40)
WBC NRBC COR # BLD: 4.68 10*3/MM3 (ref 3.4–10.8)

## 2023-11-02 PROCEDURE — 84443 ASSAY THYROID STIM HORMONE: CPT | Performed by: STUDENT IN AN ORGANIZED HEALTH CARE EDUCATION/TRAINING PROGRAM

## 2023-11-02 PROCEDURE — 82607 VITAMIN B-12: CPT | Performed by: STUDENT IN AN ORGANIZED HEALTH CARE EDUCATION/TRAINING PROGRAM

## 2023-11-02 PROCEDURE — 80053 COMPREHEN METABOLIC PANEL: CPT | Performed by: STUDENT IN AN ORGANIZED HEALTH CARE EDUCATION/TRAINING PROGRAM

## 2023-11-02 PROCEDURE — 85025 COMPLETE CBC W/AUTO DIFF WBC: CPT | Performed by: STUDENT IN AN ORGANIZED HEALTH CARE EDUCATION/TRAINING PROGRAM

## 2023-11-02 PROCEDURE — 80061 LIPID PANEL: CPT | Performed by: STUDENT IN AN ORGANIZED HEALTH CARE EDUCATION/TRAINING PROGRAM

## 2023-11-02 RX ORDER — HYDROQUINONE 40 MG/G
1 CREAM TOPICAL DAILY
Qty: 28.35 G | Refills: 3 | Status: SHIPPED | OUTPATIENT
Start: 2023-11-02

## 2023-11-02 RX ORDER — HYDROCORTISONE VALERATE CREAM 2 MG/G
1 CREAM TOPICAL 2 TIMES DAILY
Qty: 45 G | Refills: 3 | Status: SHIPPED | OUTPATIENT
Start: 2023-11-02

## 2023-11-02 NOTE — PROGRESS NOTES
"Chief Complaint  Annual Exam    Subjective            Cyndie Osborne presents to [facility]  History of Present Illness    Annual exam:     Requesting updated referral for her eye doctor. Following with [facility]. Has apt in 2021. Current referral order will  in 2024 and pt will need and updated referral order.     States she has days when she feels like there is a \"blob of meat\" in the left eye, this is constant but is improved when she stopped using the eye drops. Did have a period of time when she felt like she was seeing through a smudge. She stopped using the oct eye drops and this has also improved.         Past Medical History:   Diagnosis Date    Eye disease        Allergies:   Allergies   Allergen Reactions    Aloe Vera Other (See Comments)     Bruises all over her body, messed with her labs          History reviewed. No pertinent surgical history.       Social History     Socioeconomic History    Marital status:    Tobacco Use    Smoking status: Never    Smokeless tobacco: Never   Vaping Use    Vaping Use: Never used   Substance and Sexual Activity    Alcohol use: Yes     Comment: rarely    Drug use: Never    Sexual activity: Yes     Partners: Male         Family History   Problem Relation Age of Onset    Breast cancer Paternal Aunt           Health Maintenance Due   Topic Date Due    COLORECTAL CANCER SCREENING  Never done    ANNUAL PHYSICAL  Never done    PAP SMEAR  Never done            Current Outpatient Medications:     Cyanocobalamin 1000 MCG capsule, Take 1,000 mcg by mouth Daily., Disp: 90 capsule, Rfl: 3    hydrocortisone (WESTCORT) 0.2 % cream, Apply 1 application  topically to the appropriate area as directed 2 (Two) Times a Day., Disp: 45 g, Rfl: 3    hydrocortisone 2.5 % cream, Apply 1 application  topically to the appropriate area as directed Daily. Rectal, Disp: 28 g, Rfl: 3    hydroquinone 4 % cream, Apply 1 " "application  topically to the appropriate area as directed Daily., Disp: 28.35 g, Rfl: 3    tretinoin (RETIN-A) 0.025 % cream, Apply  topically to the appropriate area as directed Every Night., Disp: 45 g, Rfl: 3    Calcium Citrate-Vitamin D (CALCITRATE/VITAMIN D PO), Take 1 tablet by mouth Daily. (Patient not taking: Reported on 11/2/2023), Disp: , Rfl:     Lidocaine Viscous HCl (XYLOCAINE) 2 % solution, Take 5 mL by mouth 4 (Four) Times a Day As Needed for Mild Pain. Swish and spit (Patient not taking: Reported on 11/2/2023), Disp: 100 mL, Rfl: 0      Immunization History   Administered Date(s) Administered    Fluzone (or Fluarix & Flulaval for VFC) >6mos 10/26/2021, 11/02/2023    Tdap 11/02/2023         Review of Systems       Objective       Vitals:    11/02/23 1416   BP: 122/77   Pulse: 83   Temp: 97.8 °F (36.6 °C)   SpO2: 100%   Weight: 60 kg (132 lb 3.2 oz)   Height: 160 cm (62.99\")     Body mass index is 23.43 kg/m².      Physical Exam  Vitals reviewed.   Constitutional:       Appearance: Normal appearance.   HENT:      Head: Normocephalic and atraumatic.      Nose: Nose normal.      Mouth/Throat:      Mouth: Mucous membranes are moist.   Eyes:      Extraocular Movements: Extraocular movements intact.      Conjunctiva/sclera: Conjunctivae normal.      Pupils: Pupils are equal, round, and reactive to light.   Cardiovascular:      Rate and Rhythm: Normal rate and regular rhythm.      Pulses: Normal pulses.      Heart sounds: Normal heart sounds.   Pulmonary:      Effort: Pulmonary effort is normal. No respiratory distress.      Breath sounds: Normal breath sounds.   Abdominal:      General: Abdomen is flat. Bowel sounds are normal.      Palpations: Abdomen is soft.      Tenderness: There is no guarding or rebound.   Musculoskeletal:         General: Normal range of motion.   Skin:     General: Skin is warm and dry.   Neurological:      General: No focal deficit present.      Mental Status: She is alert and " oriented to person, place, and time.      Cranial Nerves: No cranial nerve deficit.   Psychiatric:         Mood and Affect: Mood normal.         Behavior: Behavior normal.         Thought Content: Thought content normal.             Result Review :                           Assessment and Plan      Diagnoses and all orders for this visit:    1. Annual physical exam (Primary)  Comments:  Unremarkable exam. Pt is at baseline state of health.    2. Influenza vaccine needed  Comments:  Administered in office and pt tolerated well.  Orders:  -     Fluzone >6 Months (5903-2233)    3. Medication refill  Comments:  REfilled meds.  Orders:  -     Cyanocobalamin 1000 MCG capsule; Take 1,000 mcg by mouth Daily.  Dispense: 90 capsule; Refill: 3  -     hydrocortisone (WESTCORT) 0.2 % cream; Apply 1 application  topically to the appropriate area as directed 2 (Two) Times a Day.  Dispense: 45 g; Refill: 3  -     hydrocortisone 2.5 % cream; Apply 1 application  topically to the appropriate area as directed Daily. Rectal  Dispense: 28 g; Refill: 3  -     tretinoin (RETIN-A) 0.025 % cream; Apply  topically to the appropriate area as directed Every Night.  Dispense: 45 g; Refill: 3    4. Encounter for screening mammogram for malignant neoplasm of breast  Comments:  Due for routine screen. REferral was placed 1 yr ago, however pt not aware. Printed copy of mammo order for pt.    5. Colon cancer screening declined  Comments:  Due for, however pt declined screening today despite counseling.    6. Cervical cancer screening declined  Comments:  Overdue for. Pt declined screening despite counseling .    7. Screening for hepatitis C declined  Comments:  Declined screening today.    8. Immunization due  Comments:  Due for tdap vaccine. Administered in office today and pt tolerated well.  Orders:  -     Tdap Vaccine => 8yo IM (BOOSTRIX)    9. Hyperlipidemia, unspecified hyperlipidemia type  Comments:  Chronic. REcommend trial of omega 3 fatty  acids. Due for labs which are collected today.  Orders:  -     Comprehensive Metabolic Panel  -     CBC & Differential  -     TSH  -     Lipid Panel    10. Elevated vitamin B12 level  Comments:  Due for labs.   Orders:  -     Vitamin B12    Other orders  -     hydroquinone 4 % cream; Apply 1 application  topically to the appropriate area as directed Daily.  Dispense: 28.35 g; Refill: 3          Preventive counseling about the importance of daily exercise, healthy eating and good sleep hygiene for cardiovascular and mental health discussed.           Follow Up     Return in about 1 year (around 11/2/2024) for Annual physical.    Patient was given instructions and counseling regarding her condition or for health maintenance advice. Please see specific information pulled into the AVS if appropriate.     Maribell Machuca MD   Internal Medicine-Pediatrics

## 2023-11-03 DIAGNOSIS — E78.5 HYPERLIPIDEMIA, UNSPECIFIED HYPERLIPIDEMIA TYPE: Primary | ICD-10-CM

## 2023-11-05 PROBLEM — Z53.20 COLON CANCER SCREENING DECLINED: Status: ACTIVE | Noted: 2023-11-05

## 2023-11-06 ENCOUNTER — TELEPHONE (OUTPATIENT)
Dept: INTERNAL MEDICINE | Facility: CLINIC | Age: 48
End: 2023-11-06
Payer: OTHER GOVERNMENT

## 2023-11-06 NOTE — TELEPHONE ENCOUNTER
[facility] PROVIDED THE RELAY MESSAGE FROM THE OFFICE   PATIENT VOICED UNDERSTANDING AND HAS NO FURTHER QUESTIONS AT THIS TIME    PATIENT WANTED TO LET YOU KNOW THAT SHE DID NOT FAST THE DAY OF LABS, BUT THE NEXT LABS, SHE WILL MAKE SURE SHE DOES FAST          Lori Lei MA SW    11/6/23  8:21 AM  Note      Attempted to contact patient regarding lab results. Left Voicemail to call our office back.      HUB OK TO READ/ ADVISE:            ----- Message from Maribell Machuca MD sent at 11/3/2023  6:13 PM EDT -----  Please notify patient of results. Thank you.      Cholesterol is very high at 159, goal is 100 or less. Please let patient that if her cholesterol remains high at next check we will plan to discuss medication options for treatment.      CBC: Unremarkable CBC. no signs of anemia or abnormal white blood cell count.     Normal b12 level.      Normal thyroid labs.      CMP: normal kidney function and normal electrolytes (sodium, potassium, calcium, etc...). Liver enzymes are normal.      Please let her know that lab orders have been placed which she is encouraged to have done 1 week prior to her next in office visit.      Thank You

## 2023-11-06 NOTE — TELEPHONE ENCOUNTER
Attempted to contact patient regarding lab results. Left Voicemail to call our office back.     HUB OK TO READ/ ADVISE:         ----- Message from Maribell Machuca MD sent at 11/3/2023  6:13 PM EDT -----  Please notify patient of results. Thank you.      Cholesterol is very high at 159, goal is 100 or less. Please let patient that if her cholesterol remains high at next check we will plan to discuss medication options for treatment.      CBC: Unremarkable CBC. no signs of anemia or abnormal white blood cell count.     Normal b12 level.      Normal thyroid labs.      CMP: normal kidney function and normal electrolytes (sodium, potassium, calcium, etc...). Liver enzymes are normal.      Please let her know that lab orders have been placed which she is encouraged to have done 1 week prior to her next in office visit.      Thank You

## 2023-11-06 NOTE — TELEPHONE ENCOUNTER
----- Message from Maribell Machuca MD sent at 11/3/2023  6:13 PM EDT -----  Please notify patient of results. Thank you.     Cholesterol is very high at 159, goal is 100 or less. Please let patient that if her cholesterol remains high at next check we will plan to discuss medication options for treatment.     CBC: Unremarkable CBC. no signs of anemia or abnormal white blood cell count.    Normal b12 level.     Normal thyroid labs.     CMP: normal kidney function and normal electrolytes (sodium, potassium, calcium, etc...). Liver enzymes are normal.     Please let her know that lab orders have been placed which she is encouraged to have done 1 week prior to her next in office visit.     Thank You

## 2023-12-07 DIAGNOSIS — Z12.31 ENCOUNTER FOR SCREENING MAMMOGRAM FOR MALIGNANT NEOPLASM OF BREAST: ICD-10-CM

## 2024-03-27 ENCOUNTER — TELEPHONE (OUTPATIENT)
Dept: INTERNAL MEDICINE | Facility: CLINIC | Age: 49
End: 2024-03-27
Payer: OTHER GOVERNMENT

## 2024-03-27 NOTE — TELEPHONE ENCOUNTER
Patient called requesting updated  referral for Stef Rucker.  referral updated and mailed to patient per request on 3/27/24.

## 2024-10-29 ENCOUNTER — CLINICAL SUPPORT (OUTPATIENT)
Dept: INTERNAL MEDICINE | Facility: CLINIC | Age: 49
End: 2024-10-29
Payer: OTHER GOVERNMENT

## 2024-10-29 DIAGNOSIS — Z00.00 LABORATORY EXAM ORDERED AS PART OF ROUTINE GENERAL MEDICAL EXAMINATION: Primary | ICD-10-CM

## 2024-10-29 DIAGNOSIS — E78.5 HYPERLIPIDEMIA, UNSPECIFIED HYPERLIPIDEMIA TYPE: ICD-10-CM

## 2024-10-29 LAB
25(OH)D3 SERPL-MCNC: 37.1 NG/ML (ref 30–100)
ALBUMIN SERPL-MCNC: 4.5 G/DL (ref 3.5–5.2)
ALBUMIN/GLOB SERPL: 1.6 G/DL
ALP SERPL-CCNC: 61 U/L (ref 39–117)
ALT SERPL W P-5'-P-CCNC: 12 U/L (ref 1–33)
ANION GAP SERPL CALCULATED.3IONS-SCNC: 10.7 MMOL/L (ref 5–15)
AST SERPL-CCNC: 13 U/L (ref 1–32)
BASOPHILS # BLD AUTO: 0.03 10*3/MM3 (ref 0–0.2)
BASOPHILS NFR BLD AUTO: 0.7 % (ref 0–1.5)
BILIRUB SERPL-MCNC: 0.6 MG/DL (ref 0–1.2)
BUN SERPL-MCNC: 10 MG/DL (ref 6–20)
BUN/CREAT SERPL: 11.4 (ref 7–25)
CALCIUM SPEC-SCNC: 9.2 MG/DL (ref 8.6–10.5)
CHLORIDE SERPL-SCNC: 102 MMOL/L (ref 98–107)
CHOLEST SERPL-MCNC: 225 MG/DL (ref 0–200)
CO2 SERPL-SCNC: 25.3 MMOL/L (ref 22–29)
CREAT SERPL-MCNC: 0.88 MG/DL (ref 0.57–1)
DEPRECATED RDW RBC AUTO: 42.7 FL (ref 37–54)
EGFRCR SERPLBLD CKD-EPI 2021: 81.2 ML/MIN/1.73
EOSINOPHIL # BLD AUTO: 0.05 10*3/MM3 (ref 0–0.4)
EOSINOPHIL NFR BLD AUTO: 1.1 % (ref 0.3–6.2)
ERYTHROCYTE [DISTWIDTH] IN BLOOD BY AUTOMATED COUNT: 11.8 % (ref 12.3–15.4)
FERRITIN SERPL-MCNC: 204 NG/ML (ref 13–150)
GLOBULIN UR ELPH-MCNC: 2.9 GM/DL
GLUCOSE SERPL-MCNC: 91 MG/DL (ref 65–99)
HCT VFR BLD AUTO: 41.6 % (ref 34–46.6)
HDLC SERPL-MCNC: 69 MG/DL (ref 40–60)
HGB BLD-MCNC: 13.3 G/DL (ref 12–15.9)
IMM GRANULOCYTES # BLD AUTO: 0.01 10*3/MM3 (ref 0–0.05)
IMM GRANULOCYTES NFR BLD AUTO: 0.2 % (ref 0–0.5)
IRON 24H UR-MRATE: 91 MCG/DL (ref 37–145)
IRON SATN MFR SERPL: 24 % (ref 20–50)
LDLC SERPL CALC-MCNC: 141 MG/DL (ref 0–100)
LDLC/HDLC SERPL: 2.01 {RATIO}
LYMPHOCYTES # BLD AUTO: 1.49 10*3/MM3 (ref 0.7–3.1)
LYMPHOCYTES NFR BLD AUTO: 33.6 % (ref 19.6–45.3)
MCH RBC QN AUTO: 31.7 PG (ref 26.6–33)
MCHC RBC AUTO-ENTMCNC: 32 G/DL (ref 31.5–35.7)
MCV RBC AUTO: 99.3 FL (ref 79–97)
MONOCYTES # BLD AUTO: 0.31 10*3/MM3 (ref 0.1–0.9)
MONOCYTES NFR BLD AUTO: 7 % (ref 5–12)
NEUTROPHILS NFR BLD AUTO: 2.54 10*3/MM3 (ref 1.7–7)
NEUTROPHILS NFR BLD AUTO: 57.4 % (ref 42.7–76)
NRBC BLD AUTO-RTO: 0 /100 WBC (ref 0–0.2)
PLATELET # BLD AUTO: 273 10*3/MM3 (ref 140–450)
PMV BLD AUTO: 9.9 FL (ref 6–12)
POTASSIUM SERPL-SCNC: 4 MMOL/L (ref 3.5–5.2)
PROT SERPL-MCNC: 7.4 G/DL (ref 6–8.5)
RBC # BLD AUTO: 4.19 10*6/MM3 (ref 3.77–5.28)
SODIUM SERPL-SCNC: 138 MMOL/L (ref 136–145)
T4 FREE SERPL-MCNC: 1.23 NG/DL (ref 0.92–1.68)
TIBC SERPL-MCNC: 386 MCG/DL (ref 298–536)
TRANSFERRIN SERPL-MCNC: 259 MG/DL (ref 200–360)
TRIGL SERPL-MCNC: 88 MG/DL (ref 0–150)
TSH SERPL DL<=0.05 MIU/L-ACNC: 1.63 UIU/ML (ref 0.27–4.2)
VIT B12 BLD-MCNC: 847 PG/ML (ref 211–946)
VLDLC SERPL-MCNC: 15 MG/DL (ref 5–40)
WBC NRBC COR # BLD AUTO: 4.43 10*3/MM3 (ref 3.4–10.8)

## 2024-10-29 PROCEDURE — 83540 ASSAY OF IRON: CPT | Performed by: STUDENT IN AN ORGANIZED HEALTH CARE EDUCATION/TRAINING PROGRAM

## 2024-10-29 PROCEDURE — 82306 VITAMIN D 25 HYDROXY: CPT | Performed by: STUDENT IN AN ORGANIZED HEALTH CARE EDUCATION/TRAINING PROGRAM

## 2024-10-29 PROCEDURE — 84443 ASSAY THYROID STIM HORMONE: CPT | Performed by: STUDENT IN AN ORGANIZED HEALTH CARE EDUCATION/TRAINING PROGRAM

## 2024-10-29 PROCEDURE — 82728 ASSAY OF FERRITIN: CPT | Performed by: STUDENT IN AN ORGANIZED HEALTH CARE EDUCATION/TRAINING PROGRAM

## 2024-10-29 PROCEDURE — 84439 ASSAY OF FREE THYROXINE: CPT | Performed by: STUDENT IN AN ORGANIZED HEALTH CARE EDUCATION/TRAINING PROGRAM

## 2024-10-29 PROCEDURE — 85025 COMPLETE CBC W/AUTO DIFF WBC: CPT | Performed by: STUDENT IN AN ORGANIZED HEALTH CARE EDUCATION/TRAINING PROGRAM

## 2024-10-29 PROCEDURE — 80053 COMPREHEN METABOLIC PANEL: CPT | Performed by: STUDENT IN AN ORGANIZED HEALTH CARE EDUCATION/TRAINING PROGRAM

## 2024-10-29 PROCEDURE — 80061 LIPID PANEL: CPT | Performed by: STUDENT IN AN ORGANIZED HEALTH CARE EDUCATION/TRAINING PROGRAM

## 2024-10-29 PROCEDURE — 36415 COLL VENOUS BLD VENIPUNCTURE: CPT | Performed by: STUDENT IN AN ORGANIZED HEALTH CARE EDUCATION/TRAINING PROGRAM

## 2024-10-29 PROCEDURE — 84466 ASSAY OF TRANSFERRIN: CPT | Performed by: STUDENT IN AN ORGANIZED HEALTH CARE EDUCATION/TRAINING PROGRAM

## 2024-10-29 PROCEDURE — 82607 VITAMIN B-12: CPT | Performed by: STUDENT IN AN ORGANIZED HEALTH CARE EDUCATION/TRAINING PROGRAM

## 2024-10-29 NOTE — PROGRESS NOTES
Venipuncture Blood Specimen Collection  Venipuncture performed in [facility] by Shirley Bowman RN with good hemostasis. Patient tolerated the procedure well without complications.   10/29/24   Shirley Bowman RN

## 2024-11-05 ENCOUNTER — OFFICE VISIT (OUTPATIENT)
Dept: INTERNAL MEDICINE | Facility: CLINIC | Age: 49
End: 2024-11-05
Payer: OTHER GOVERNMENT

## 2024-11-05 VITALS
WEIGHT: 135.2 LBS | SYSTOLIC BLOOD PRESSURE: 126 MMHG | HEIGHT: 63 IN | DIASTOLIC BLOOD PRESSURE: 84 MMHG | HEART RATE: 87 BPM | BODY MASS INDEX: 23.96 KG/M2 | RESPIRATION RATE: 20 BRPM | OXYGEN SATURATION: 99 % | TEMPERATURE: 97.6 F

## 2024-11-05 DIAGNOSIS — Z76.0 MEDICATION REFILL: ICD-10-CM

## 2024-11-05 DIAGNOSIS — Z00.00 ANNUAL PHYSICAL EXAM: Primary | ICD-10-CM

## 2024-11-05 DIAGNOSIS — E78.00 HYPERCHOLESTEROLEMIA: ICD-10-CM

## 2024-11-05 DIAGNOSIS — E55.9 VITAMIN D INSUFFICIENCY: ICD-10-CM

## 2024-11-05 DIAGNOSIS — Z12.31 ENCOUNTER FOR SCREENING MAMMOGRAM FOR MALIGNANT NEOPLASM OF BREAST: ICD-10-CM

## 2024-11-05 DIAGNOSIS — R53.83 OTHER FATIGUE: ICD-10-CM

## 2024-11-05 DIAGNOSIS — K64.8 HEMORRHOID PROLAPSE: ICD-10-CM

## 2024-11-05 DIAGNOSIS — Z12.11 COLON CANCER SCREENING: ICD-10-CM

## 2024-11-05 PROCEDURE — 99396 PREV VISIT EST AGE 40-64: CPT | Performed by: STUDENT IN AN ORGANIZED HEALTH CARE EDUCATION/TRAINING PROGRAM

## 2024-11-05 PROCEDURE — 99213 OFFICE O/P EST LOW 20 MIN: CPT | Performed by: STUDENT IN AN ORGANIZED HEALTH CARE EDUCATION/TRAINING PROGRAM

## 2024-11-05 RX ORDER — CHOLECALCIFEROL (VITAMIN D3) 25 MCG
1000 TABLET ORAL DAILY
Qty: 90 TABLET | Refills: 1 | Status: SHIPPED | OUTPATIENT
Start: 2024-11-05

## 2024-11-05 RX ORDER — TRETINOIN 0.25 MG/G
CREAM TOPICAL NIGHTLY
Qty: 45 G | Refills: 3 | Status: SHIPPED | OUTPATIENT
Start: 2024-11-05

## 2024-11-05 RX ORDER — HYDROCORTISONE 25 MG/G
1 CREAM TOPICAL DAILY
Qty: 28 G | Refills: 3 | Status: SHIPPED | OUTPATIENT
Start: 2024-11-05

## 2024-11-05 RX ORDER — PRAMOXINE HYDROCHLORIDE 10 MG/G
AEROSOL, FOAM TOPICAL
Qty: 15 G | Refills: 1 | Status: SHIPPED | OUTPATIENT
Start: 2024-11-05

## 2024-11-05 RX ORDER — HYDROCORTISONE VALERATE CREAM 2 MG/G
1 CREAM TOPICAL 2 TIMES DAILY
Qty: 45 G | Refills: 3 | Status: SHIPPED | OUTPATIENT
Start: 2024-11-05

## 2024-11-05 RX ORDER — HYDROQUINONE 40 MG/G
1 CREAM TOPICAL DAILY
Qty: 28.35 G | Refills: 3 | Status: SHIPPED | OUTPATIENT
Start: 2024-11-05

## 2024-11-05 NOTE — PROGRESS NOTES
"Chief Complaint  Annual Exam, referral  (Mammogram ), and Med Refill    Subjective            Cyndie Osborne presents to [facility]  History of Present Illness    Health maintenance:  Mammogram: due for. New order placed.      Eye disease:  Following with Naveed and will be seeing Jacey Lopez.   Current order is set to  in 2025. Pt's f/u appointment is scheduled for 2025.     Severe hemorrhoids:  Chronic, using hydrocortisone otc. States she has been using this for many years. States she's had hemorrhoids for 26 years and is self treating w/ otc agents. \"I've been poke and proded for so long\" she is not interested in seeing a colorectal specialist at this time. The poke and proded is related to pt having multiple testing done for abdominal pain in her 20's. States she does have chronic constipation, and stools are \"like concrete\" for which she is taking 2 laxatives nightly. States the laxatives are working. States she's tried psyllium in past and dulcolax  but did not work well, states stools were \"gooey\" which was worst.   No family hx of colon cancer.   She is open to getting a cologuard.        Past Medical History:   Diagnosis Date    Eye disease        Allergies:   Allergies   Allergen Reactions    Aloe Vera Other (See Comments)     Bruises all over her body, messed with her labs          History reviewed. No pertinent surgical history.       Social History     Socioeconomic History    Marital status:    Tobacco Use    Smoking status: Never    Smokeless tobacco: Never   Vaping Use    Vaping status: Never Used   Substance and Sexual Activity    Alcohol use: Yes     Comment: rarely    Drug use: Never    Sexual activity: Yes     Partners: Male         Family History   Problem Relation Age of Onset    Breast cancer Paternal Aunt           Health Maintenance Due   Topic Date Due    COLORECTAL CANCER SCREENING  Never done    " "HEPATITIS C SCREENING  Never done    PAP SMEAR  Never done            Current Outpatient Medications:     Calcium Citrate-Vitamin D (CALCITRATE/VITAMIN D PO), Take 1 tablet by mouth Daily., Disp: , Rfl:     Cyanocobalamin 1000 MCG capsule, Take 1,000 mcg by mouth Daily., Disp: 90 capsule, Rfl: 3    hydrocortisone (WESTCORT) 0.2 % cream, Apply 1 Application topically to the appropriate area as directed 2 (Two) Times a Day., Disp: 45 g, Rfl: 3    hydrocortisone 2.5 % cream, Apply 1 Application topically to the appropriate area as directed Daily. Rectal, Disp: 28 g, Rfl: 3    hydroquinone 4 % cream, Apply 1 Application topically to the appropriate area as directed Daily., Disp: 28.35 g, Rfl: 3    tretinoin (RETIN-A) 0.025 % cream, Apply  topically to the appropriate area as directed Every Night., Disp: 45 g, Rfl: 3    cholecalciferol (Vitamin D) 25 MCG (1000 UT) tablet, Take 1 tablet by mouth Daily., Disp: 90 tablet, Rfl: 1    Lidocaine Viscous HCl (XYLOCAINE) 2 % solution, Take 5 mL by mouth 4 (Four) Times a Day As Needed for Mild Pain. Swish and spit (Patient not taking: Reported on 11/5/2024), Disp: 100 mL, Rfl: 0    Pramoxine HCl, Perianal, (Proctofoam) 1 % foam, Insert  into the rectum Every 2 (Two) Hours As Needed for Hemorrhoids., Disp: 15 g, Rfl: 1      Immunization History   Administered Date(s) Administered    COVID-19 (MODERNA) 12YRS+ (SPIKEVAX) 10/07/2024    Fluzone  >6mos 10/07/2024    Fluzone (or Fluarix & Flulaval for VFC) >6mos 10/26/2021, 11/02/2023    Tdap 11/02/2023         Review of Systems       Objective       Vitals:    11/05/24 1328   BP: 126/84   BP Location: Left arm   Patient Position: Sitting   Cuff Size: Adult   Pulse: 87   Resp: 20   Temp: 97.6 °F (36.4 °C)   TempSrc: Temporal   SpO2: 99%   Weight: 61.3 kg (135 lb 3.2 oz)   Height: 160 cm (62.99\")     BMI is within normal parameters. No other follow-up for BMI required.         Physical Exam  Vitals reviewed.   Constitutional:       " Appearance: Normal appearance.   HENT:      Head: Normocephalic and atraumatic.      Nose: Nose normal.   Eyes:      Extraocular Movements: Extraocular movements intact.      Conjunctiva/sclera: Conjunctivae normal.   Cardiovascular:      Rate and Rhythm: Normal rate and regular rhythm.      Pulses: Normal pulses.      Heart sounds: Normal heart sounds.   Pulmonary:      Effort: Pulmonary effort is normal. No respiratory distress.      Breath sounds: Normal breath sounds.   Abdominal:      General: Abdomen is flat. Bowel sounds are normal. There is no distension.      Palpations: Abdomen is soft. There is no mass.   Musculoskeletal:         General: Normal range of motion.   Skin:     General: Skin is warm and dry.   Neurological:      General: No focal deficit present.      Mental Status: She is alert and oriented to person, place, and time.      Cranial Nerves: No cranial nerve deficit.   Psychiatric:         Mood and Affect: Mood normal.         Behavior: Behavior normal.         Thought Content: Thought content normal.     Pt declined perineal and rectal exam.        Result Review :     The following data was reviewed by: Maribell Machuca MD on 11/05/2024:    Common labs          10/29/2024    11:33   Common Labs   Glucose 91    BUN 10    Creatinine 0.88    Sodium 138    Potassium 4.0    Chloride 102    Calcium 9.2    Albumin 4.5    Total Bilirubin 0.6    Alkaline Phosphatase 61    AST (SGOT) 13    ALT (SGPT) 12    WBC 4.43    Hemoglobin 13.3    Hematocrit 41.6    Platelets 273    Total Cholesterol 225    Triglycerides 88    HDL Cholesterol 69    LDL Cholesterol  141                      Assessment and Plan      Diagnoses and all orders for this visit:    1. Annual physical exam (Primary)  At baseline state of health.     2. Encounter for screening mammogram for malignant neoplasm of breast  Due for screening.  -     Mammo Screening Digital Tomosynthesis Bilateral With CAD    3. Hemorrhoid prolapse  Chronic, Pt  states she has been dealing w/ this for many years. Seen by GI in the past (late teens-early 20's). Discussed referral to GI vs colorectal surgery for further eval which she declines.     -     Pramoxine HCl, Perianal, (Proctofoam) 1 % foam; Insert  into the rectum Every 2 (Two) Hours As Needed for Hemorrhoids.  Dispense: 15 g; Refill: 1    4. Medication refill  Comments:  REfilled meds.  Orders:  -     Cyanocobalamin 1000 MCG capsule; Take 1,000 mcg by mouth Daily.  Dispense: 90 capsule; Refill: 3  -     hydrocortisone (WESTCORT) 0.2 % cream; Apply 1 Application topically to the appropriate area as directed 2 (Two) Times a Day.  Dispense: 45 g; Refill: 3  -     hydrocortisone 2.5 % cream; Apply 1 Application topically to the appropriate area as directed Daily. Rectal  Dispense: 28 g; Refill: 3  -     tretinoin (RETIN-A) 0.025 % cream; Apply  topically to the appropriate area as directed Every Night.  Dispense: 45 g; Refill: 3    5. Colon cancer screening  Due for. Declines referral for colonoscopy.   -     Cologuard - Stool, Per Rectum; Future    6. Vitamin D insufficiency  -     cholecalciferol (Vitamin D) 25 MCG (1000 UT) tablet; Take 1 tablet by mouth Daily.  Dispense: 90 tablet; Refill: 1    7. Hypercholesterolemia  Chronic, likely w/ genetic component w/ pt reporting eating a healthy balanced diet. Recommend trial of otc fish oil.    8. Other fatigue  Chronic, w/ unremarkable w/u. Discussed referral to sleep medicine for evaluation for GARRY which she will consider.     Other orders  -     hydroquinone 4 % cream; Apply 1 Application topically to the appropriate area as directed Daily.  Dispense: 28.35 g; Refill: 3      Preventive counseling about the importance of daily exercise, healthy eating and good sleep hygiene for cardiovascular and mental health discussed.        Follow Up     Return in about 1 year (around 11/5/2025).    Patient was given instructions and counseling regarding her condition or for health  maintenance advice. Please see specific information pulled into the AVS if appropriate.     Maribell Machuca MD   Internal Medicine-Pediatrics

## 2024-12-11 DIAGNOSIS — Z12.31 ENCOUNTER FOR SCREENING MAMMOGRAM FOR MALIGNANT NEOPLASM OF BREAST: Primary | ICD-10-CM

## 2025-01-24 ENCOUNTER — TELEPHONE (OUTPATIENT)
Dept: INTERNAL MEDICINE | Facility: CLINIC | Age: 50
End: 2025-01-24
Payer: OTHER GOVERNMENT

## 2025-05-02 ENCOUNTER — OFFICE VISIT (OUTPATIENT)
Dept: INTERNAL MEDICINE | Facility: CLINIC | Age: 50
End: 2025-05-02
Payer: OTHER GOVERNMENT

## 2025-05-02 ENCOUNTER — TELEPHONE (OUTPATIENT)
Dept: INTERNAL MEDICINE | Facility: CLINIC | Age: 50
End: 2025-05-02

## 2025-05-02 VITALS
WEIGHT: 126.4 LBS | DIASTOLIC BLOOD PRESSURE: 78 MMHG | OXYGEN SATURATION: 97 % | TEMPERATURE: 96.2 F | BODY MASS INDEX: 22.4 KG/M2 | HEART RATE: 91 BPM | SYSTOLIC BLOOD PRESSURE: 126 MMHG

## 2025-05-02 DIAGNOSIS — R63.1 INCREASED THIRST: ICD-10-CM

## 2025-05-02 DIAGNOSIS — E55.9 VITAMIN D INSUFFICIENCY: ICD-10-CM

## 2025-05-02 DIAGNOSIS — Q38.5 PALATE ABNORMALITY: Primary | ICD-10-CM

## 2025-05-02 DIAGNOSIS — M89.8X9 BONY GROWTH: ICD-10-CM

## 2025-05-02 DIAGNOSIS — R68.2 DRY MOUTH: ICD-10-CM

## 2025-05-02 DIAGNOSIS — Z12.11 COLON CANCER SCREENING: ICD-10-CM

## 2025-05-02 RX ORDER — CHLORHEXIDINE GLUCONATE ORAL RINSE 1.2 MG/ML
15 SOLUTION DENTAL 2 TIMES DAILY
Qty: 473 ML | Refills: 1 | Status: SHIPPED | OUTPATIENT
Start: 2025-05-02

## 2025-05-02 RX ORDER — CHOLECALCIFEROL (VITAMIN D3) 25 MCG
1000 TABLET ORAL DAILY
Qty: 90 TABLET | Refills: 1 | Status: SHIPPED | OUTPATIENT
Start: 2025-05-02

## 2025-05-02 NOTE — PROGRESS NOTES
"Chief Complaint  Dental Pain (Notes her tongue had felt scolded, cotton mouth. Did salt wash to try and help. Happened in January and then again in April. Noticed a lump on roof of mouth, on 4/25. )    Subjective            Cyndie Osborne presents to [facility]  History of Present Illness    Patient states she's having issues with her mouth.   States on Jan 26th, it felt like my tongue was scalded.   States a couple years ago her dentist treated her for a yeast infection when she had a similar sensation. States she did a salt wash for 3-4d and symptoms went away.   States on April 10th the symptoms recur, she did salt wash again which helped, but then her mouth started feeling dry, \"cotton mouth\" with \"really bad breath\". She did salt rinse again but this was not as helpful. She changed her mouthwash, started using a tongue scraper, biotin and lozenges. States she then noticed a \"huge lump\" over the roof her mouth. 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 States she also has concerns that this could be cancer. States she is feeling her nasal passages to be dryier than usual.  States \"cotton mouth sensation\"is better as of 3d ago but not completely resolved.     No new medications or supplements.  She is a former smoker, few cigarettes a day for 10 years. Quit in her 20's.   She drinks alcohol occasionally.   No hx of STDs/STIs.    She did not schedule an appointment at any point in time for this.       Past Medical History:   Diagnosis Date    Eye disease        Allergies:   Allergies   Allergen Reactions    Aloe Vera Other (See Comments)     Bruises all over her body, messed with her labs          No past surgical history on file.       Social History     Socioeconomic History    Marital status:    Tobacco Use    Smoking status: Never    Smokeless tobacco: Never   Vaping Use    Vaping status: Never Used   Substance and Sexual " Activity    Alcohol use: Yes     Comment: rarely    Drug use: Never    Sexual activity: Yes     Partners: Male         Family History   Problem Relation Age of Onset    Breast cancer Paternal Aunt           Health Maintenance Due   Topic Date Due    PAP SMEAR  Never done    COLORECTAL CANCER SCREENING  Never done    HEPATITIS C SCREENING  Never done            Current Outpatient Medications:     Calcium Citrate-Vitamin D (CALCITRATE/VITAMIN D PO), Take 1 tablet by mouth Daily., Disp: , Rfl:     cholecalciferol (Vitamin D) 25 MCG (1000 UT) tablet, Take 1 tablet by mouth Daily., Disp: 90 tablet, Rfl: 1    Cyanocobalamin 1000 MCG capsule, Take 1,000 mcg by mouth Daily. (Patient taking differently: Take 1,000 mcg by mouth Every Other Day.), Disp: 90 capsule, Rfl: 3    hydrocortisone (WESTCORT) 0.2 % cream, Apply 1 Application topically to the appropriate area as directed 2 (Two) Times a Day., Disp: 45 g, Rfl: 3    hydrocortisone 2.5 % cream, Apply 1 Application topically to the appropriate area as directed Daily. Rectal, Disp: 28 g, Rfl: 3    hydroquinone 4 % cream, Apply 1 Application topically to the appropriate area as directed Daily., Disp: 28.35 g, Rfl: 3    tretinoin (RETIN-A) 0.025 % cream, Apply  topically to the appropriate area as directed Every Night., Disp: 45 g, Rfl: 3    chlorhexidine (Peridex) 0.12 % solution, Apply 15 mL to the mouth or throat 2 (Two) Times a Day., Disp: 473 mL, Rfl: 1    Lidocaine Viscous HCl (XYLOCAINE) 2 % solution, Take 5 mL by mouth 4 (Four) Times a Day As Needed for Mild Pain. Swish and spit (Patient not taking: Reported on 11/2/2023), Disp: 100 mL, Rfl: 0    Pramoxine HCl, Perianal, (Proctofoam) 1 % foam, Insert  into the rectum Every 2 (Two) Hours As Needed for Hemorrhoids. (Patient not taking: Reported on 5/2/2025), Disp: 15 g, Rfl: 1      Immunization History   Administered Date(s) Administered    COVID-19 (MODERNA) 12YRS+ (SPIKEVAX) 10/07/2024    Fluzone  >6mos 10/07/2024     Fluzone (or Fluarix & Flulaval for VFC) >6mos 10/26/2021, 11/02/2023    Tdap 11/02/2023         Review of Systems       Objective       Vitals:    05/02/25 0905   BP: 126/78   BP Location: Right arm   Patient Position: Sitting   Cuff Size: Adult   Pulse: 91   Temp: 96.2 °F (35.7 °C)   TempSrc: Temporal   SpO2: 97%   Weight: 57.3 kg (126 lb 6.4 oz)     BMI is within normal parameters. No other follow-up for BMI required.     Physical Exam  Vitals reviewed.   Constitutional:       Appearance: Normal appearance.   HENT:      Head: Normocephalic and atraumatic.      Nose: Nose normal.      Mouth/Throat:      Mouth: Mucous membranes are moist.      Dentition: Normal dentition.      Tongue: No lesions.      Pharynx: Oropharynx is clear. No oropharyngeal exudate or posterior oropharyngeal erythema.      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      Conjunctiva/sclera: Conjunctivae normal.   Cardiovascular:      Rate and Rhythm: Normal rate and regular rhythm.      Pulses: Normal pulses.      Heart sounds: Normal heart sounds.   Pulmonary:      Effort: Pulmonary effort is normal. No respiratory distress.      Breath sounds: Normal breath sounds.   Musculoskeletal:         General: Normal range of motion.   Skin:     General: Skin is warm and dry.   Neurological:      General: No focal deficit present.      Mental Status: She is alert and oriented to person, place, and time.      Cranial Nerves: No cranial nerve deficit.   Psychiatric:         Mood and Affect: Mood normal.         Behavior: Behavior normal.         Thought Content: Thought content normal.             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 Referring to ENT.     Orders:    Ambulatory Referral to ENT (Otolaryngology)    Ambulatory  Referral to ENT (Otolaryngology)    Increased thirst  Dry mouth  Acute, pt w/ reporting abnormal sensation over her tongue. No oral lesions noted. Tongue appear normal on exam. Pt reporting concern for halitosis, none noted on exam today, although provider was wearing facemask during visit. Recommend pt reaches out to her dentist for tongue and dry mouth concern. Peridex sent in.     Orders:    chlorhexidine (Peridex) 0.12 % solution; Apply 15 mL to the mouth or throat 2 (Two) Times a Day.    Comprehensive Metabolic Panel    CBC & Differential    TSH    Lipid Panel    T4, Free    Vitamin D 25 hydroxy    Iron and TIBC    Ferritin    Vitamin B12 & Folate    Ambulatory Referral to ENT (Otolaryngology)    Vitamin D insufficiency  Chronic, due for labs.   Orders:    cholecalciferol (Vitamin D) 25 MCG (1000 UT) tablet; Take 1 tablet by mouth Daily.    Colon cancer screening  Declined by patient                      Follow Up     No follow-ups on file.    Patient was given instructions and counseling regarding her condition or for health maintenance advice. Please see specific information pulled into the AVS if appropriate.     Maribell Machuca MD   Internal Medicine-Pediatrics

## 2025-05-02 NOTE — TELEPHONE ENCOUNTER
[facility] [place]  [address] [place] [address]  [phone]               Thank you for choosing us for your health care visit with Chantel Orozco MD.  We are glad to serve you and happy to provide you with this summary of your visit.   Please HUB TO RELAY    Left vm letting pt know  approved ENT referral to  and Dr. Amaya office (both in [place]) and I have faxed the referrals    ACCU-CHEK HOANG Luiza   Use as directed. DX  250.00 non insulin using           * ACCU-CHEK HOANG Strp   Generic drug:  Glucose Blood           * ACCU-CHEK HOANG PLUS Strp   Generic drug:  Glucose Blood   USE TWICE DAILY BEFORE BREAKFAST AND DINNER. * Notice: This list has 2 medication(s) that are the same as other medications prescribed for you. Read the directions carefully, and ask your doctor or other care provider to review them with you.          Where to Get Your Medications      These medicat priority on exercise in your life                    Visit [facility] online at  [email or web address]

## 2025-07-23 ENCOUNTER — TELEPHONE (OUTPATIENT)
Dept: INTERNAL MEDICINE | Facility: CLINIC | Age: 50
End: 2025-07-23
Payer: OTHER GOVERNMENT